# Patient Record
Sex: FEMALE | Race: WHITE | NOT HISPANIC OR LATINO | Employment: FULL TIME | URBAN - METROPOLITAN AREA
[De-identification: names, ages, dates, MRNs, and addresses within clinical notes are randomized per-mention and may not be internally consistent; named-entity substitution may affect disease eponyms.]

---

## 2020-02-03 ENCOUNTER — OFFICE VISIT (OUTPATIENT)
Dept: PODIATRY | Facility: CLINIC | Age: 30
End: 2020-02-03
Payer: COMMERCIAL

## 2020-02-03 VITALS
SYSTOLIC BLOOD PRESSURE: 157 MMHG | BODY MASS INDEX: 29.94 KG/M2 | WEIGHT: 169 LBS | DIASTOLIC BLOOD PRESSURE: 83 MMHG | HEART RATE: 88 BPM

## 2020-02-03 DIAGNOSIS — M25.572 PAIN IN JOINT OF LEFT FOOT: Primary | ICD-10-CM

## 2020-02-03 PROCEDURE — 99203 OFFICE O/P NEW LOW 30 MIN: CPT | Performed by: PODIATRIST

## 2020-02-03 RX ORDER — DIPHENOXYLATE HYDROCHLORIDE AND ATROPINE SULFATE 2.5; .025 MG/1; MG/1
1 TABLET ORAL DAILY
COMMUNITY

## 2020-02-03 RX ORDER — MELOXICAM 15 MG/1
15 TABLET ORAL DAILY
Qty: 30 TABLET | Refills: 0 | Status: SHIPPED | OUTPATIENT
Start: 2020-02-03 | End: 2020-03-04

## 2020-02-03 RX ORDER — NORGESTIMATE AND ETHINYL ESTRADIOL 0.25-0.035
KIT ORAL
COMMUNITY
Start: 2020-01-13

## 2020-02-03 NOTE — PROGRESS NOTES
Assessment/Plan:    Explained to patient that she has hallux limitus of the left great toe joint  If aspur was not noted on x-ray, it is likely that she has 1st metatarsal elevatus  Advised patient to return to all activities including running and returning to a high-heeled shoe but limiting the heel height  She was placed on meloxicam 15 mg daily  If pain is triggered, cortisone injection and re-x-ray would be recommended  Current symptoms are not severe enough to consider surgical intervention  No problem-specific Assessment & Plan notes found for this encounter  Diagnoses and all orders for this visit:    Pain in joint of left foot  -     meloxicam (MOBIC) 15 mg tablet; Take 1 tablet (15 mg total) by mouth daily    Other orders  -     norgestimate-ethinyl estradiol (SPRINTEC 28) 0 25-35 MG-MCG per tablet; TAKE 1 TABLET BY MOUTH EVERY DAY  -     multivitamin (THERAGRAN) TABS; Take 1 tablet by mouth daily          Subjective:      Patient ID: Uma Gonsalez is a 30 y o  female  HPI     Patient, an active 49-year-old female in good health presents with intermittent pain in the left great toe joint  Patient states that she has had difficulty with this joint off and on for years  She does not recall any specific trauma  Approximately 2 months ago she was seen by another doctor and had x-rays which were read as negative  He could find no difficulty with the left great toe joint  Patient states that the joint becomes painful if it is struck a certain way or if she wears high heels or tries to walk up steps  Today, she has minimized her activity and stop wearing high-heeled shoes and pain is not present  A red discoloration is noted on the top of the left great toe joint however      The following portions of the patient's history were reviewed and updated as appropriate: allergies, current medications, past family history, past medical history, past social history, past surgical history and problem list     Review of Systems   Cardiovascular: Negative  Gastrointestinal: Negative  Musculoskeletal: Negative  Neurological: Negative  Objective:      /83   Pulse 88   Wt 76 7 kg (169 lb)   BMI 29 94 kg/m²          Physical Exam   Constitutional: She is oriented to person, place, and time  She appears well-developed and well-nourished  Cardiovascular: Regular rhythm and intact distal pulses  Musculoskeletal: She exhibits deformity  Erythema noted dorsum 1st MPJ left foot  Range of motion left 1st MPJ is slightly restricted at 40° compared to right foot 1st MPJ which has normal motion  There is no pain or crepitus with motion  Neurological: She is alert and oriented to person, place, and time  Skin: Skin is warm  No erythema